# Patient Record
Sex: MALE | Race: WHITE
[De-identification: names, ages, dates, MRNs, and addresses within clinical notes are randomized per-mention and may not be internally consistent; named-entity substitution may affect disease eponyms.]

---

## 2021-03-25 ENCOUNTER — HOSPITAL ENCOUNTER (EMERGENCY)
Dept: HOSPITAL 63 - ER | Age: 46
Discharge: HOME | End: 2021-03-25
Payer: OTHER GOVERNMENT

## 2021-03-25 VITALS
DIASTOLIC BLOOD PRESSURE: 96 MMHG | HEIGHT: 73 IN | SYSTOLIC BLOOD PRESSURE: 120 MMHG | BODY MASS INDEX: 26.3 KG/M2 | WEIGHT: 198.42 LBS

## 2021-03-25 DIAGNOSIS — T78.40XA: Primary | ICD-10-CM

## 2021-03-25 LAB
ALBUMIN SERPL-MCNC: 4.2 G/DL (ref 3.4–5)
ALBUMIN/GLOB SERPL: 1.4 {RATIO} (ref 1–1.7)
ALP SERPL-CCNC: 42 U/L (ref 46–116)
ALT SERPL-CCNC: 153 U/L (ref 16–63)
ANION GAP SERPL CALC-SCNC: 8 MMOL/L (ref 6–14)
AST SERPL-CCNC: 140 U/L (ref 15–37)
BASOPHILS # BLD AUTO: 0 X10^3/UL (ref 0–0.2)
BASOPHILS NFR BLD: 1 % (ref 0–3)
BILIRUB SERPL-MCNC: 0.4 MG/DL (ref 0.2–1)
BUN/CREAT SERPL: 6 (ref 6–20)
CA-I SERPL ISE-MCNC: 5 MG/DL (ref 8–26)
CALCIUM SERPL-MCNC: 8.5 MG/DL (ref 8.5–10.1)
CHLORIDE SERPL-SCNC: 106 MMOL/L (ref 98–107)
CO2 SERPL-SCNC: 31 MMOL/L (ref 21–32)
CREAT SERPL-MCNC: 0.9 MG/DL (ref 0.7–1.3)
EOSINOPHIL NFR BLD: 0.1 X10^3/UL (ref 0–0.7)
EOSINOPHIL NFR BLD: 3 % (ref 0–3)
ERYTHROCYTE [DISTWIDTH] IN BLOOD BY AUTOMATED COUNT: 12.6 % (ref 11.5–14.5)
GFR SERPLBLD BASED ON 1.73 SQ M-ARVRAT: 91.3 ML/MIN
GLOBULIN SER-MCNC: 3 G/DL (ref 2.2–3.8)
GLUCOSE SERPL-MCNC: 81 MG/DL (ref 70–99)
HCT VFR BLD CALC: 44.3 % (ref 39–53)
HGB BLD-MCNC: 14.9 G/DL (ref 13–17.5)
LYMPHOCYTES # BLD: 0.8 X10^3/UL (ref 1–4.8)
LYMPHOCYTES NFR BLD AUTO: 24 % (ref 24–48)
MCH RBC QN AUTO: 33 PG (ref 25–35)
MCHC RBC AUTO-ENTMCNC: 34 G/DL (ref 31–37)
MCV RBC AUTO: 99 FL (ref 79–100)
MONO #: 0.3 X10^3/UL (ref 0–1.1)
MONOCYTES NFR BLD: 9 % (ref 0–9)
NEUT #: 2.1 X10^3UL (ref 1.8–7.7)
NEUTROPHILS NFR BLD AUTO: 63 % (ref 31–73)
PLATELET # BLD AUTO: 144 X10^3/UL (ref 140–400)
POTASSIUM SERPL-SCNC: 3.7 MMOL/L (ref 3.5–5.1)
PROT SERPL-MCNC: 7.2 G/DL (ref 6.4–8.2)
RBC # BLD AUTO: 4.48 X10^6/UL (ref 4.3–5.7)
SODIUM SERPL-SCNC: 145 MMOL/L (ref 136–145)
WBC # BLD AUTO: 3.3 X10^3/UL (ref 4–11)

## 2021-03-25 PROCEDURE — 85025 COMPLETE CBC W/AUTO DIFF WBC: CPT

## 2021-03-25 PROCEDURE — 36415 COLL VENOUS BLD VENIPUNCTURE: CPT

## 2021-03-25 PROCEDURE — 99285 EMERGENCY DEPT VISIT HI MDM: CPT

## 2021-03-25 PROCEDURE — 96375 TX/PRO/DX INJ NEW DRUG ADDON: CPT

## 2021-03-25 PROCEDURE — 80053 COMPREHEN METABOLIC PANEL: CPT

## 2021-03-25 PROCEDURE — 96374 THER/PROPH/DIAG INJ IV PUSH: CPT

## 2021-03-25 PROCEDURE — 71045 X-RAY EXAM CHEST 1 VIEW: CPT

## 2021-03-25 PROCEDURE — 96361 HYDRATE IV INFUSION ADD-ON: CPT

## 2021-03-25 PROCEDURE — 93005 ELECTROCARDIOGRAM TRACING: CPT

## 2021-03-25 NOTE — EKG
[facility] [address], [place] [address]

Test Date:    2021               Test Time:    14:22:58

Pat Name:     EVELIO LAYNE           Department:   

Patient ID:   [id number]           Room:          

Gender:       M                        Technician:   GEORGE

:          1975               Requested By: ZULEMA CULP

Order Number: [id number]            Reading MD:     

                                 Measurements

Intervals                              Axis          

Rate:         76                       P:            28

OK:           158                      QRS:          47

QRSD:         80                       T:            46

QT:           392                                    

QTc:          445                                    

                           Interpretive Statements

SINUS RHYTHM

NORMAL ECG

RI6.02

No previous ECG available for comparison

## 2021-03-25 NOTE — PHYS DOC
Past History


Past Medical History:  No Pertinent History


Past Surgical History:  Knee Replacement


Alcohol Use:  None





Adult General


Chief Complaint


Chief Complaint:  ALLERGIC REACTION





HPI


HPI





Patient is a 45-year-old male no significant past history presenting to 

emergency department for what appears to be an concern for an acute allergic 

reaction.  Patient states he got a secondary vaccine and shortly afterwards 

noted tingling feeling in his face and his tongue as well as generalized ration 

mottling of the skin.  Denies any history of similar symptoms and denies ever 

having anaphylactic reaction or allergies before.  Currently denies any throat 

swelling, tongue swelling, fever, chills, nausea, vomiting, chest pain or 

shortness of breath.





Review of Systems


Review of Systems





Constitutional: Denies fever or chills []


Eyes: Denies change in visual acuity, redness, or eye pain []


HENT: Denies nasal congestion or sore throat []


Respiratory: Denies cough or shortness of breath []


Cardiovascular: No additional information not addressed in HPI []


GI: Denies abdominal pain, nausea, vomiting, bloody stools or diarrhea []


: Denies dysuria or hematuria []


Musculoskeletal: Denies back pain or joint pain []


Integument: Denies rash or skin lesions []


Neurologic: Denies headache, focal weakness or sensory changes []


Endocrine: Denies polyuria or polydipsia []





All other systems were reviewed and found to be within normal limits, except as 

documented in this note.





Current Medications


Current Medications





Current Medications








 Medications


  (Trade)  Dose


 Ordered  Sig/Brian  Start Time


 Stop Time Status Last Admin


Dose Admin


 


 Famotidine


  (Pepcid Vial)  20 mg  1X  ONCE  3/25/21 14:15


 3/25/21 14:17 DC 3/25/21 14:19


20 MG


 


 Methylprednisolone


 Sodium Succinate


  (SOLU-Medrol


 125MG VIAL)  125 mg  1X  ONCE  3/25/21 14:15


 3/25/21 14:17 DC 3/25/21 14:19


125 MG


 


 Sodium Chloride  1,000 ml @ 


 1,000 mls/hr  Q1H  3/25/21 14:15


 3/25/21 15:14 DC 3/25/21 14:17


1,000 MLS/HR











Allergies


Allergies





Allergies








Coded Allergies Type Severity Reaction Last Updated Verified


 


  No Known Drug Allergies    3/25/21 No











Physical Exam


Physical Exam





Constitutional: Well developed, well nourished, no acute distress, non-toxic 

appearance. []


HENT: Normocephalic, atraumatic, bilateral external ears normal, oropharynx 

moist, no oral exudates, nose normal. []


Eyes: PERRLA, EOMI, conjunctiva normal, no discharge. [] 


Neck: Normal range of motion, no tenderness, supple, no stridor. [] 


Cardiovascular:Heart rate regular rhythm, no murmur []


Lungs & Thorax:  Bilateral breath sounds clear to auscultation []


Abdomen: Bowel sounds normal, soft, no tenderness, no masses, no pulsatile 

masses. [] 


Skin: Warm, dry, no erythema, erythema around the neck and mild mottling 

bilateral upper extremities


Back: No tenderness, no CVA tenderness. [] 


Extremities: No tenderness, no cyanosis, no clubbing, ROM intact, no edema. [] 


Neurologic: Alert and oriented X 3, normal motor function, normal sensory 

function, no focal deficits noted. []


Psychologic: Affect normal, judgement normal, mood normal. []





Current Patient Data


Vital Signs





                                   Vital Signs








  Date Time  Temp Pulse Resp B/P (MAP) Pulse Ox O2 Delivery O2 Flow Rate FiO2


 


3/25/21 13:57 98.1 80 16 120/96 (104) 99 Room Air  








Lab Results





                                Laboratory Tests








Test


 3/25/21


14:22


 


White Blood Count


 3.3 x10^3/uL


(4.0-11.0)  L


 


Red Blood Count


 4.48 x10^6/uL


(4.30-5.70)


 


Hemoglobin


 14.9 g/dL


(13.0-17.5)


 


Hematocrit


 44.3 %


(39.0-53.0)


 


Mean Corpuscular Volume


 99 fL ()





 


Mean Corpuscular Hemoglobin 33 pg (25-35)  


 


Mean Corpuscular Hemoglobin


Concent 34 g/dL


(31-37)


 


Red Cell Distribution Width


 12.6 %


(11.5-14.5)


 


Platelet Count


 144 x10^3/uL


(140-400)


 


Neutrophils (%) (Auto) 63 % (31-73)  


 


Lymphocytes (%) (Auto) 24 % (24-48)  


 


Monocytes (%) (Auto) 9 % (0-9)  


 


Eosinophils (%) (Auto) 3 % (0-3)  


 


Basophils (%) (Auto) 1 % (0-3)  


 


Neutrophils # (Auto)


 2.1 x10^3uL


(1.8-7.7)


 


Lymphocytes # (Auto)


 0.8 x10^3/uL


(1.0-4.8)  L


 


Monocytes # (Auto)


 0.3 x10^3/uL


(0.0-1.1)


 


Eosinophils # (Auto)


 0.1 x10^3/uL


(0.0-0.7)


 


Basophils # (Auto)


 0.0 x10^3/uL


(0.0-0.2)


 


Sodium Level


 145 mmol/L


(136-145)


 


Potassium Level


 3.7 mmol/L


(3.5-5.1)


 


Chloride Level


 106 mmol/L


()


 


Carbon Dioxide Level


 31 mmol/L


(21-32)


 


Anion Gap 8 (6-14)  


 


Blood Urea Nitrogen


 5 mg/dL (8-26)


L


 


Creatinine


 0.9 mg/dL


(0.7-1.3)


 


Estimated GFR


(Cockcroft-Gault) 91.3  





 


BUN/Creatinine Ratio 6 (6-20)  


 


Glucose Level


 81 mg/dL


(70-99)


 


Calcium Level


 8.5 mg/dL


(8.5-10.1)


 


Total Bilirubin


 0.4 mg/dL


(0.2-1.0)


 


Aspartate Amino Transferase


(AST) 140 U/L


(15-37)  H


 


Alanine Aminotransferase (ALT)


 153 U/L


(16-63)  H


 


Alkaline Phosphatase


 42 U/L


()  L


 


Total Protein


 7.2 g/dL


(6.4-8.2)


 


Albumin


 4.2 g/dL


(3.4-5.0)


 


Albumin/Globulin Ratio 1.4 (1.0-1.7)  











EKG


EKG


[]





Radiology/Procedures


Radiology/Procedures


[]





Heart Score


C/O Chest Pain:  No


Risk Factors:


Risk Factors:  DM, Current or recent (<one month) smoker, HTN, HLP, family 

history of CAD, obesity.


Risk Scores:


Risk Factors:  DM, Current or recent (<one month) smoker, HTN, HLP, family 

history of CAD, obesity.





Course & Med Decision Making


Course & Med Decision Making


Pertinent Labs and Imaging studies reviewed. (See chart for details)





45 male presenting to emergency department for acute allergic reaction.  Did 

take Benadryl prior to coming here any significant improvement.  Will provide IV

 Solu-Medrol and Pepcid and reevaluate.





16:28 -labs reviewed and unremarkable.  Symptoms have completely resolved 

patient is feeling better.  Will discharge home with EpiPen and given patient 

instructions on Benadryl Pepcid as needed for symptoms and return if symptoms 

return or worsen in any way





Dragon Disclaimer


Dragon Disclaimer


This electronic medical record was generated, in whole or in part, using a voice

 recognition dictation system.





Departure


Departure:


Impression:  


   Primary Impression:  


   Allergic reaction


Disposition:  01 DC HOME SELF CARE/HOMELESS


Condition:  GOOD


Referrals:  


NEHEMIAH MONSALVE MD


Patient Instructions:  Drug Allergy





Additional Instructions:  


Fever, our emergency department.  You were seen for what appears to be an 

allergic reaction secondary to the vaccine that you received.  It is important 

that you make a follow-up appointment with primary care doctor to determine if 

you have any further issues with allergies and determine the actual substance.  

Please return to the emergency department if you have any sudden worsening of 

your symptoms, fever greater than 101 F or severe chest pain.





EMERGENCY DEPARTMENT GENERAL DISCHARGE INSTRUCTIONS





Thank you for coming to [facility] Emergency Department (ED) today

 and 


trusting us with you care.  We trust that you had a positive experience in our 

Emergency 


Department.  If you wish to speak to the department management, you may call the

 Director at 


[phone].





YOUR FOLLOW UP INSTRUCTIONS ARE AS FOLLOWS:





1.  Do you have a private Doctor?  If you do not have a private doctor, please 

ask for a 


resource list of physicians or clinics that may be able to assist you with 

follow up care.





2.  The Emergency Physicain has interpreted your x-rays.  The X-Ray specialist 

will also 


review them.  If there is a change in the findings, you will be notified in 48 

hours when at 


all possible.





3.  A lab test or culture has been done, your results will be reviewed and you 

will be 


notified if you need a change in treatment.





ADDITIONAL INSTRUCTIONS AND INFORMATION:





1.  Your care today has been supervised by a physician who is specially trained 

in emergency 


care.  Many problems require more than one evaluation for a complete diagnosis 

and 


treatment.  We recommend that you schedule your follow up appointment as 

recommended to 


ensure complete treatment of you illness or injury.  If you are unable to obtain

 follow up 


care and continue to have a problem, or if your condition worsens, we recommend 

that you 


return to the ED.





2.  We are not able to safely determine your condition over the phone nor are we

 able to 


give sound medical advice over the phone.  For these safety reasons, if you call

 for medical 


advice we will ask you to come to the ED for further evaluation.





3.  If you have any questions regarding these discharge instructions please call

 the ED at 


[phone].





SAFETY INFORMATION:





In the interest of safety, wellness, and injury prevention; we encourage you to 

wear your 


sealbelt, if you smoke; quite smoking, and we encourage family to use a 

protective helmet 


for bicycling and other sporting events that present an increased risk for head 

injury.





IF YOUR SYMPTOMS WORSEN OR NEW SYMPTOMS DEVELOP, OR YOU HAVE CONCERNS ABOUT YOUR

 CONDITION; 


OR IF YOUR CONDITION WORSENS WHILE YOU ARE WAITING FOR YOUR FOLLOW UP 

APPOINTMENT; EITHER 


CONTACT YOUR PRIMARY CARE DOCTOR, THE PHYSICIAN WHOSE NAME AND NUMBER YOU WERE 

GIVEN, OR 


RETURN TO THE ED IMMEDIATELY.


Scripts


Epinephrine (EPIPEN 2-BRIDGER) 0.3 Mg/0.3 Ml Auto.injct


1 SYR IM ONCE for allergy for 1 Day, #1 PACKET 0 Refills


   Prov: ZULEMA CULP MD         3/25/21











ZULEMA CULP MD              Mar 25, 2021 16:33

## 2021-03-25 NOTE — RAD
EXAMINATION: XR CHEST 1V



CLINICAL HISTORY: Chest pain, possible reaction to covid vaccine



EXAM DATE/TIME: 3/25/2021 2:15 PM



COMPARISON: None





FINDINGS: 



Lines, Tubes, and Devices: None.



Cardiomediastinal Silhouette: Within normal limits.



Lungs and Pleura: Minimal left basilar subsegmental atelectasis. No evidence of focal airspace consol
idation or pleural effusion. Pulmonary vasculature unremarkable.



Bones and Soft Tissues: Degenerative changes of the thoracic spine.

 



IMPRESSION:



No evidence of acute cardiopulmonary abnormality.



Electronically signed by: Ramón Geronimo DO (3/25/2021 2:29 PM) [id number]